# Patient Record
Sex: FEMALE | Race: WHITE | URBAN - METROPOLITAN AREA
[De-identification: names, ages, dates, MRNs, and addresses within clinical notes are randomized per-mention and may not be internally consistent; named-entity substitution may affect disease eponyms.]

---

## 2019-04-01 ENCOUNTER — APPOINTMENT (RX ONLY)
Dept: URBAN - METROPOLITAN AREA CLINIC 114 | Facility: CLINIC | Age: 74
Setting detail: DERMATOLOGY
End: 2019-04-01

## 2019-04-01 DIAGNOSIS — L98.8 OTHER SPECIFIED DISORDERS OF THE SKIN AND SUBCUTANEOUS TISSUE: ICD-10-CM

## 2019-04-01 PROBLEM — L85.3 XEROSIS CUTIS: Status: ACTIVE | Noted: 2019-04-01

## 2019-04-01 PROBLEM — L29.8 OTHER PRURITUS: Status: ACTIVE | Noted: 2019-04-01

## 2019-04-01 PROBLEM — L70.0 ACNE VULGARIS: Status: ACTIVE | Noted: 2019-04-01

## 2019-04-01 PROBLEM — M12.9 ARTHROPATHY, UNSPECIFIED: Status: ACTIVE | Noted: 2019-04-01

## 2019-04-01 PROBLEM — L57.0 ACTINIC KERATOSIS: Status: ACTIVE | Noted: 2019-04-01

## 2019-04-01 PROCEDURE — ? PRODUCT LINE (OFFICE PRODUCTS)

## 2019-04-01 PROCEDURE — ? BOTOX

## 2019-04-01 ASSESSMENT — LOCATION DETAILED DESCRIPTION DERM
LOCATION DETAILED: LEFT INFERIOR TEMPLE
LOCATION DETAILED: RIGHT INFERIOR TEMPLE
LOCATION DETAILED: SUPERIOR MID FOREHEAD
LOCATION DETAILED: RIGHT SUPERIOR CENTRAL MALAR CHEEK
LOCATION DETAILED: GLABELLA

## 2019-04-01 ASSESSMENT — LOCATION SIMPLE DESCRIPTION DERM
LOCATION SIMPLE: LEFT TEMPLE
LOCATION SIMPLE: GLABELLA
LOCATION SIMPLE: RIGHT CHEEK
LOCATION SIMPLE: RIGHT TEMPLE
LOCATION SIMPLE: SUPERIOR FOREHEAD

## 2019-04-01 ASSESSMENT — LOCATION ZONE DERM: LOCATION ZONE: FACE

## 2019-04-01 NOTE — PROCEDURE: PRODUCT LINE (OFFICE PRODUCTS)
Product 66 Units: 0
Product 71 Price (In Dollars - Numeric Only, No Special Characters Or $): 0.00
Send Charges To Patient Encounter: Yes
Name Of Product 1: SkinBetter Lines
Product 1 Price (In Dollars - Numeric Only, No Special Characters Or $): 125.00
Product 1 Units: 1
Product 1 Application Directions: Apply to lines two times a day
Detail Level: Zone

## 2019-04-01 NOTE — HPI: COSMETIC (BOTOX)
Have You Had Botox Before?: has had botox
Additional History: Glabella: 24 units, Crows feet: 24 units, forehead: 4 units

## 2019-04-01 NOTE — PROCEDURE: BOTOX
Additional Area 5 Units: 0
Forehead Units: 8
Glabellar Complex Units: 16
Price (Use Numbers Only, No Special Characters Or $): 504.00
Post-Care Instructions: Patient instructed to not lie down for 4 hours and limit physical activity for 24 hours. Patient instructed not to travel by airplane for 48 hours.
Dilution (U/0.1 Cc): 2.5
Detail Level: Detailed
Expiration Date (Month Year): 08/2021
Periorbital Skin Units: 24
Consent: Written consent obtained. Risks include but not limited to lid/brow ptosis, bruising, swelling, diplopia, temporary effect, incomplete chemical denervation.
Lot #: [id number]
Reconstitution Date (Optional): 03/26/2019

## 2019-10-14 ENCOUNTER — APPOINTMENT (RX ONLY)
Dept: URBAN - METROPOLITAN AREA CLINIC 114 | Facility: CLINIC | Age: 74
Setting detail: DERMATOLOGY
End: 2019-10-14

## 2019-10-14 DIAGNOSIS — L98.8 OTHER SPECIFIED DISORDERS OF THE SKIN AND SUBCUTANEOUS TISSUE: ICD-10-CM

## 2019-10-14 PROCEDURE — ? BOTOX

## 2019-10-14 ASSESSMENT — LOCATION DETAILED DESCRIPTION DERM
LOCATION DETAILED: LEFT INFERIOR TEMPLE
LOCATION DETAILED: RIGHT INFERIOR TEMPLE
LOCATION DETAILED: GLABELLA
LOCATION DETAILED: SUPERIOR MID FOREHEAD

## 2019-10-14 ASSESSMENT — LOCATION ZONE DERM: LOCATION ZONE: FACE

## 2019-10-14 ASSESSMENT — LOCATION SIMPLE DESCRIPTION DERM
LOCATION SIMPLE: LEFT TEMPLE
LOCATION SIMPLE: SUPERIOR FOREHEAD
LOCATION SIMPLE: GLABELLA
LOCATION SIMPLE: RIGHT TEMPLE

## 2019-10-14 NOTE — PROCEDURE: BOTOX
Reconstitution Date (Optional): 02/2022
Price (Use Numbers Only, No Special Characters Or $): 013.00
Nasal Root Units: 0
Dilution (U/0.1 Cc): 2.5
Forehead Units: 8
Detail Level: Detailed
Lot #: [id number]
Periorbital Skin Units: 24
Expiration Date (Month Year): 08/2021
Post-Care Instructions: Patient instructed to not lie down for 4 hours and limit physical activity for 24 hours. Patient instructed not to travel by airplane for 48 hours.
Glabellar Complex Units: 16
Consent: Written consent obtained. Risks include but not limited to lid/brow ptosis, bruising, swelling, diplopia, temporary effect, incomplete chemical denervation.

## 2019-10-14 NOTE — PROCEDURE: MIPS QUALITY
Quality 431: Preventive Care And Screening: Unhealthy Alcohol Use - Screening: Patient screened for unhealthy alcohol use using a single question and scores less than 2 times per year
Quality 130: Documentation Of Current Medications In The Medical Record: Current Medications Documented
Quality 226: Preventive Care And Screening: Tobacco Use: Screening And Cessation Intervention: Patient screened for tobacco use and is an ex/non-smoker
Quality 131: Pain Assessment And Follow-Up: Pain assessment using a standardized tool is documented as negative, no follow-up plan required
Detail Level: Detailed
Quality 110: Preventive Care And Screening: Influenza Immunization: Influenza Immunization Administered during Influenza season
Quality 111:Pneumonia Vaccination Status For Older Adults: Pneumococcal Vaccination Previously Received

## 2019-11-19 ENCOUNTER — APPOINTMENT (RX ONLY)
Dept: URBAN - METROPOLITAN AREA CLINIC 114 | Facility: CLINIC | Age: 74
Setting detail: DERMATOLOGY
End: 2019-11-19

## 2019-11-19 DIAGNOSIS — L98.8 OTHER SPECIFIED DISORDERS OF THE SKIN AND SUBCUTANEOUS TISSUE: ICD-10-CM

## 2019-11-19 PROCEDURE — ? ADDITIONAL NOTES

## 2019-11-19 NOTE — PROCEDURE: ADDITIONAL NOTES
Detail Level: Simple
Additional Notes: Patient is pleased with results. Interested in PRP, patient is not on blood thinners.

## 2019-12-17 ENCOUNTER — APPOINTMENT (RX ONLY)
Dept: URBAN - METROPOLITAN AREA CLINIC 114 | Facility: CLINIC | Age: 74
Setting detail: DERMATOLOGY
End: 2019-12-17

## 2019-12-17 DIAGNOSIS — L64.8 OTHER ANDROGENIC ALOPECIA: ICD-10-CM

## 2019-12-17 PROCEDURE — ? PLATELET RICH PLASMA INJECTION

## 2019-12-17 NOTE — HPI: ANDROGENIC ALOPECIA
Is This A New Presentation, Or A Follow-Up?: Hair Loss
How Did The Hair Loss Occur?: gradual in onset
How Severe Is Your Hair Loss?: moderate
Additional History: PRP #1

## 2019-12-17 NOTE — PROCEDURE: PLATELET RICH PLASMA INJECTION
Depth In Mm (Will Not Render If 0): 0
Location #1: frontal and mid scalp
Show Additional Techniques: Yes
Amount Injected At This Location In Cc (Will Not Render If 0): 0.7
Which Technique?: Default
Price (Use Numbers Only, No Special Characters Or $): 265
Treatment Number (Optional): 1
Post-Care Instructions: After the procedure, take precautions agains sun exposure. Do not apply sunscreen for 12 hours after the procedure. Do not apply make-up for 12 hours after the procedure. Avoid alcohol based toners for 10-14 days. After 2-3 days patients can return to their regular skin regimen.
Consent: Written consent obtained, risks reviewed including but not limited to pain, scarring, infection, shock hair loss, and incomplete improvement.  Patient understands the procedure is cosmetic in nature and will require out of pocket payment.
Standard Default Technique For Making Prp: Venipuncture was performed.  Isolated blood placed into centrifuge and spun down in order to concentrate platelets. Separation of platelet rich and platelet poor plasma performed., Name and identifying information placed on tubes., Angelina cooler warmed up to provide numbing. Platelet rich plasma injected at evenly spaced intervals in areas of hair loss, total 7cc.
Detail Level: Zone

## 2020-01-14 ENCOUNTER — APPOINTMENT (RX ONLY)
Dept: URBAN - METROPOLITAN AREA CLINIC 114 | Facility: CLINIC | Age: 75
Setting detail: DERMATOLOGY
End: 2020-01-14

## 2020-01-14 DIAGNOSIS — L64.8 OTHER ANDROGENIC ALOPECIA: ICD-10-CM

## 2020-01-14 PROCEDURE — ? PLATELET RICH PLASMA INJECTION

## 2020-01-14 NOTE — PROCEDURE: PLATELET RICH PLASMA INJECTION
Amount Injected At This Location In Cc (Will Not Render If 0): 0
Post-Care Instructions: After the procedure, take precautions agains sun exposure. Do not apply sunscreen for 12 hours after the procedure. Do not apply make-up for 12 hours after the procedure. Avoid alcohol based toners for 10-14 days. After 2-3 days patients can return to their regular skin regimen.
Standard Default Technique For Making Prp: Venipuncture was performed.  Isolated blood placed into centrifuge and spun down in order to concentrate platelets. Separation of platelet rich and platelet poor plasma performed., Name and identifying information placed on tubes., Angelina cooler warmed up to provide numbing. Platelet rich plasma injected at evenly spaced intervals in areas of hair loss, total 7cc.
Amount Injected At This Location In Cc (Will Not Render If 0): 0.7
Detail Level: Zone
Location #1: frontal and mid scalp
Price (Use Numbers Only, No Special Characters Or $): 204
Treatment Number (Optional): 2
Which Technique?: Default
Show Additional Techniques: Yes
Consent: Written consent obtained, risks reviewed including but not limited to pain, scarring, infection, shock hair loss, and incomplete improvement.  Patient understands the procedure is cosmetic in nature and will require out of pocket payment.

## 2020-02-11 ENCOUNTER — APPOINTMENT (RX ONLY)
Dept: URBAN - METROPOLITAN AREA CLINIC 114 | Facility: CLINIC | Age: 75
Setting detail: DERMATOLOGY
End: 2020-02-11

## 2020-02-11 DIAGNOSIS — L64.8 OTHER ANDROGENIC ALOPECIA: ICD-10-CM

## 2020-02-11 PROCEDURE — ? PLATELET RICH PLASMA INJECTION

## 2020-02-11 NOTE — PROCEDURE: MIPS QUALITY
Quality 110: Preventive Care And Screening: Influenza Immunization: Influenza Immunization Administered during Influenza season
Quality 431: Preventive Care And Screening: Unhealthy Alcohol Use - Screening: Patient screened for unhealthy alcohol use using a single question and scores less than 2 times per year
Quality 111:Pneumonia Vaccination Status For Older Adults: Pneumococcal Vaccination Administered
Detail Level: Detailed
Quality 226: Preventive Care And Screening: Tobacco Use: Screening And Cessation Intervention: Patient screened for tobacco use and is an ex/non-smoker
Quality 130: Documentation Of Current Medications In The Medical Record: Current Medications Documented

## 2020-02-11 NOTE — PROCEDURE: PLATELET RICH PLASMA INJECTION
Depth In Mm (Will Not Render If 0): 0
Treatment Number (Optional): 3
Post-Care Instructions: After the procedure, take precautions agains sun exposure. Do not apply sunscreen for 12 hours after the procedure. Do not apply make-up for 12 hours after the procedure. Avoid alcohol based toners for 10-14 days. After 2-3 days patients can return to their regular skin regimen.
Amount Injected At This Location In Cc (Will Not Render If 0): 0.7
Standard Default Technique For Making Prp: Venipuncture was performed.  Isolated blood placed into centrifuge and spun down in order to concentrate platelets. Separation of platelet rich and platelet poor plasma performed., Name and identifying information placed on tubes., Angelina cooler warmed up to provide numbing. Platelet rich plasma injected at evenly spaced intervals in areas of hair loss, total 7cc.
Detail Level: Zone
Show Additional Techniques: Yes
Consent: Written consent obtained, risks reviewed including but not limited to pain, scarring, infection, shock hair loss, and incomplete improvement.  Patient understands the procedure is cosmetic in nature and will require out of pocket payment.
Location #1: frontal and mid scalp
Which Technique?: Default
Price (Use Numbers Only, No Special Characters Or $): 061

## 2020-03-24 ENCOUNTER — APPOINTMENT (RX ONLY)
Dept: URBAN - METROPOLITAN AREA CLINIC 114 | Facility: CLINIC | Age: 75
Setting detail: DERMATOLOGY
End: 2020-03-24

## 2020-03-24 DIAGNOSIS — L64.8 OTHER ANDROGENIC ALOPECIA: ICD-10-CM

## 2020-03-24 PROCEDURE — ? PLATELET RICH PLASMA INJECTION

## 2020-03-24 NOTE — PROCEDURE: PLATELET RICH PLASMA INJECTION
Amount Injected At This Location In Cc (Will Not Render If 0): 0.7
Detail Level: Zone
Price (Use Numbers Only, No Special Characters Or $): 637
Which Technique?: Default
Amount Injected At This Location In Cc (Will Not Render If 0): 0
Post-Care Instructions: After the procedure, take precautions agains sun exposure. Do not apply sunscreen for 12 hours after the procedure. Do not apply make-up for 12 hours after the procedure. Avoid alcohol based toners for 10-14 days. After 2-3 days patients can return to their regular skin regimen.
Consent: Written consent obtained, risks reviewed including but not limited to pain, scarring, infection, shock hair loss, and incomplete improvement.  Patient understands the procedure is cosmetic in nature and will require out of pocket payment.
Show Additional Techniques: Yes
Location #1: frontal and mid scalp
Standard Default Technique For Making Prp: Venipuncture was performed.  Isolated blood placed into centrifuge and spun down in order to concentrate platelets. Separation of platelet rich and platelet poor plasma performed., Name and identifying information placed on tubes., Angelina cooler warmed up to provide numbing. Platelet rich plasma injected at evenly spaced intervals in areas of hair loss, total 7cc.
Treatment Number (Optional): 4

## 2020-08-25 ENCOUNTER — APPOINTMENT (RX ONLY)
Dept: URBAN - METROPOLITAN AREA CLINIC 114 | Facility: CLINIC | Age: 75
Setting detail: DERMATOLOGY
End: 2020-08-25

## 2020-08-25 DIAGNOSIS — L64.8 OTHER ANDROGENIC ALOPECIA: ICD-10-CM

## 2020-08-25 PROCEDURE — ? PLATELET RICH PLASMA INJECTION

## 2020-08-25 NOTE — PROCEDURE: PLATELET RICH PLASMA INJECTION
Amount Injected At This Location In Cc (Will Not Render If 0): 7
Detail Level: Zone
Price (Use Numbers Only, No Special Characters Or $): 970
Which Technique?: Default
Amount Injected At This Location In Cc (Will Not Render If 0): 0
Post-Care Instructions: After the procedure, take precautions agains sun exposure. Do not apply sunscreen for 12 hours after the procedure. Do not apply make-up for 12 hours after the procedure. Avoid alcohol based toners for 10-14 days. After 2-3 days patients can return to their regular skin regimen.
Consent: Written consent obtained, risks reviewed including but not limited to pain, scarring, infection, shock hair loss, and incomplete improvement.  Patient understands the procedure is cosmetic in nature and will require out of pocket payment.
Show Additional Techniques: Yes
Location #1: frontal, mid and vertex of scalp
Standard Default Technique For Making Prp: Venipuncture was performed.  Isolated blood placed into centrifuge and spun down in order to concentrate platelets. Separation of platelet rich and platelet poor plasma performed., Name and identifying information placed on tubes., Angelina cooler warmed up to provide numbing. Platelet rich plasma injected at evenly spaced intervals in areas of hair loss, total 7cc.
Treatment Number (Optional): 5

## 2020-08-25 NOTE — PROCEDURE: MIPS QUALITY
Quality 110: Preventive Care And Screening: Influenza Immunization: Influenza Immunization Administered during Influenza season
Quality 226: Preventive Care And Screening: Tobacco Use: Screening And Cessation Intervention: Patient screened for tobacco use and is an ex/non-smoker
Quality 111:Pneumonia Vaccination Status For Older Adults: Pneumococcal Vaccination Administered
Quality 431: Preventive Care And Screening: Unhealthy Alcohol Use - Screening: Patient screened for unhealthy alcohol use using a single question and scores less than 2 times per year
Quality 130: Documentation Of Current Medications In The Medical Record: Current Medications Documented
Detail Level: Detailed

## 2020-12-23 NOTE — PROCEDURE: MIPS QUALITY
Quality 110: Preventive Care And Screening: Influenza Immunization: Influenza Immunization Administered during Influenza season
Quality 226: Preventive Care And Screening: Tobacco Use: Screening And Cessation Intervention: Patient screened for tobacco use and is an ex/non-smoker
Opt out
Quality 111:Pneumonia Vaccination Status For Older Adults: Pneumococcal Vaccination Administered
Quality 431: Preventive Care And Screening: Unhealthy Alcohol Use - Screening: Patient screened for unhealthy alcohol use using a single question and scores less than 2 times per year
Quality 130: Documentation Of Current Medications In The Medical Record: Current Medications Documented
Detail Level: Detailed

## 2021-02-11 ENCOUNTER — APPOINTMENT (RX ONLY)
Dept: URBAN - METROPOLITAN AREA CLINIC 114 | Facility: CLINIC | Age: 76
Setting detail: DERMATOLOGY
End: 2021-02-11

## 2021-02-11 DIAGNOSIS — L73.9 FOLLICULAR DISORDER, UNSPECIFIED: ICD-10-CM

## 2021-02-11 DIAGNOSIS — L738 OTHER SPECIFIED DISEASES OF HAIR AND HAIR FOLLICLES: ICD-10-CM

## 2021-02-11 DIAGNOSIS — L29.89 OTHER PRURITUS: ICD-10-CM | Status: UNCHANGED

## 2021-02-11 DIAGNOSIS — L29.8 OTHER PRURITUS: ICD-10-CM | Status: UNCHANGED

## 2021-02-11 DIAGNOSIS — L21.8 OTHER SEBORRHEIC DERMATITIS: ICD-10-CM | Status: INADEQUATELY CONTROLLED

## 2021-02-11 DIAGNOSIS — L663 OTHER SPECIFIED DISEASES OF HAIR AND HAIR FOLLICLES: ICD-10-CM

## 2021-02-11 PROBLEM — L02.821 FURUNCLE OF HEAD [ANY PART, EXCEPT FACE]: Status: ACTIVE | Noted: 2021-02-11

## 2021-02-11 PROCEDURE — ? DIAGNOSIS COMMENT

## 2021-02-11 PROCEDURE — 99214 OFFICE O/P EST MOD 30 MIN: CPT

## 2021-02-11 PROCEDURE — ? COUNSELING

## 2021-02-11 PROCEDURE — ? ADDITIONAL NOTES

## 2021-02-11 PROCEDURE — ? PRESCRIPTION MEDICATION MANAGEMENT

## 2021-02-11 ASSESSMENT — LOCATION ZONE DERM: LOCATION ZONE: SCALP

## 2021-02-11 ASSESSMENT — LOCATION DETAILED DESCRIPTION DERM
LOCATION DETAILED: LEFT SUPERIOR PARIETAL SCALP
LOCATION DETAILED: POSTERIOR MID-PARIETAL SCALP

## 2021-02-11 ASSESSMENT — LOCATION SIMPLE DESCRIPTION DERM
LOCATION SIMPLE: SCALP
LOCATION SIMPLE: POSTERIOR SCALP

## 2021-02-11 ASSESSMENT — SEVERITY ASSESSMENT: HOW SEVERE IS THIS PATIENT'S CONDITION?: MILD

## 2021-02-11 NOTE — PROCEDURE: PRESCRIPTION MEDICATION MANAGEMENT
Render In Strict Bullet Format?: No
Detail Level: Zone
Continue Regimen: Doxycycline 100mg BID
Continue Regimen: Alternate between Ketoconazole and Plexion shampoo.

## 2021-02-11 NOTE — PROCEDURE: ADDITIONAL NOTES
Detail Level: Zone
Render Risk Assessment In Note?: no
Additional Notes: **Discussed possibility of a low-grade contact dermatitis (from hair dye) contributing to or causing most of her itch\\n--On exam today, there was a single folliculitis lesion and vague signs of seborrheic dermatitis -- primary complaint was itch to entire top of scalp\\n--If not improved in 3-4 weeks, may consider biopsy

## 2021-03-02 ENCOUNTER — APPOINTMENT (RX ONLY)
Dept: URBAN - METROPOLITAN AREA CLINIC 114 | Facility: CLINIC | Age: 76
Setting detail: DERMATOLOGY
End: 2021-03-02

## 2021-03-02 DIAGNOSIS — L73.9 FOLLICULAR DISORDER, UNSPECIFIED: ICD-10-CM

## 2021-03-02 DIAGNOSIS — L663 OTHER SPECIFIED DISEASES OF HAIR AND HAIR FOLLICLES: ICD-10-CM

## 2021-03-02 DIAGNOSIS — L64.8 OTHER ANDROGENIC ALOPECIA: ICD-10-CM

## 2021-03-02 DIAGNOSIS — L738 OTHER SPECIFIED DISEASES OF HAIR AND HAIR FOLLICLES: ICD-10-CM

## 2021-03-02 PROBLEM — L02.821 FURUNCLE OF HEAD [ANY PART, EXCEPT FACE]: Status: ACTIVE | Noted: 2021-03-02

## 2021-03-02 PROCEDURE — ? DIAGNOSIS COMMENT

## 2021-03-02 PROCEDURE — ? PLATELET RICH PLASMA INJECTION

## 2021-03-02 PROCEDURE — ? COUNSELING

## 2021-03-02 ASSESSMENT — LOCATION ZONE DERM: LOCATION ZONE: SCALP

## 2021-03-02 ASSESSMENT — LOCATION DETAILED DESCRIPTION DERM: LOCATION DETAILED: LEFT SUPERIOR PARIETAL SCALP

## 2021-03-02 ASSESSMENT — LOCATION SIMPLE DESCRIPTION DERM: LOCATION SIMPLE: SCALP

## 2021-03-02 NOTE — PROCEDURE: DIAGNOSIS COMMENT
Comment: pt on Doxycycline and Ketoconazole shampoo per Dianne Waggoner
Detail Level: Simple
Render Risk Assessment In Note?: no

## 2021-03-02 NOTE — PROCEDURE: PLATELET RICH PLASMA INJECTION
Amount Injected At This Location In Cc (Will Not Render If 0): 7
Detail Level: Zone
Price (Use Numbers Only, No Special Characters Or $): 185
Which Technique?: Default
Amount Injected At This Location In Cc (Will Not Render If 0): 0
Post-Care Instructions: After the procedure, take precautions agains sun exposure. Do not apply sunscreen for 12 hours after the procedure. Do not apply make-up for 12 hours after the procedure. Avoid alcohol based toners for 10-14 days. After 2-3 days patients can return to their regular skin regimen.
Consent: Written consent obtained, risks reviewed including but not limited to pain, scarring, infection, shock hair loss, and incomplete improvement.  Patient understands the procedure is cosmetic in nature and will require out of pocket payment.
Show Additional Techniques: Yes
Location #1: mid and crown of scalp
Standard Default Technique For Making Prp: Venipuncture was performed.  Isolated blood placed into centrifuge and spun down in order to concentrate platelets. Separation of platelet rich and platelet poor plasma performed., Name and identifying information placed on tubes., Angelina cooler warmed up to provide numbing. Platelet rich plasma injected at evenly spaced intervals in areas of hair loss, total 7cc.
Treatment Number (Optional): 6

## 2021-09-07 ENCOUNTER — APPOINTMENT (RX ONLY)
Dept: URBAN - METROPOLITAN AREA CLINIC 114 | Facility: CLINIC | Age: 76
Setting detail: DERMATOLOGY
End: 2021-09-07

## 2021-09-07 DIAGNOSIS — L64.8 OTHER ANDROGENIC ALOPECIA: ICD-10-CM

## 2021-09-07 PROCEDURE — ? PLATELET RICH PLASMA INJECTION

## 2021-09-07 NOTE — PROCEDURE: PLATELET RICH PLASMA INJECTION
Amount Injected At This Location In Cc (Will Not Render If 0): 7
Detail Level: Zone
Price (Use Numbers Only, No Special Characters Or $): 123
Which Technique?: Default
Amount Injected At This Location In Cc (Will Not Render If 0): 0
Post-Care Instructions: After the procedure, take precautions agains sun exposure. Do not apply sunscreen for 12 hours after the procedure. Do not apply make-up for 12 hours after the procedure. Avoid alcohol based toners for 10-14 days. After 2-3 days patients can return to their regular skin regimen.
Consent: Written consent obtained, risks reviewed including but not limited to pain, scarring, infection, shock hair loss, and incomplete improvement.  Patient understands the procedure is cosmetic in nature and will require out of pocket payment.
Show Additional Techniques: Yes
Location #1: frontal hairline, mid and crown of scalp
Standard Default Technique For Making Prp: Venipuncture was performed.  Isolated blood placed into centrifuge and spun down in order to concentrate platelets. Separation of platelet rich and platelet poor plasma performed., Name and identifying information placed on tubes., Angelina cooler warmed up to provide numbing. Platelet rich plasma injected at evenly spaced intervals in areas of hair loss, total 7cc.

## 2021-10-01 ENCOUNTER — APPOINTMENT (RX ONLY)
Dept: URBAN - METROPOLITAN AREA CLINIC 114 | Facility: CLINIC | Age: 76
Setting detail: DERMATOLOGY
End: 2021-10-01

## 2021-10-01 DIAGNOSIS — L98.8 OTHER SPECIFIED DISORDERS OF THE SKIN AND SUBCUTANEOUS TISSUE: ICD-10-CM

## 2021-10-01 PROCEDURE — ? BOTOX

## 2021-10-01 PROCEDURE — ? DIAGNOSIS COMMENT

## 2021-10-01 PROCEDURE — ? COUNSELING

## 2021-10-01 NOTE — PROCEDURE: BOTOX
Post-Care Instructions: Patient instructed to not lie down for 4 hours and limit physical activity for 24 hours.
Additional Area 5 Units: 0
Show Depressor Anguli Units: Yes
Show Right And Left Periorbital Units: No
Dilution (U/0.1 Cc): 4
Glabellar Complex Units: 16
Reconstitution Date (Optional): 9/29/21
Periorbital Skin Units: 24
Expiration Date (Month Year): 11/2023
Consent: Written consent obtained. Risks include but not limited to lid/brow ptosis, bruising, swelling, diplopia, temporary effect, incomplete chemical denervation.
Detail Level: Detailed
Lot #: [id number]
Price (Use Numbers Only, No Special Characters Or $): 271